# Patient Record
Sex: FEMALE | Race: OTHER | HISPANIC OR LATINO | Employment: OTHER | URBAN - METROPOLITAN AREA
[De-identification: names, ages, dates, MRNs, and addresses within clinical notes are randomized per-mention and may not be internally consistent; named-entity substitution may affect disease eponyms.]

---

## 2022-08-22 ENCOUNTER — APPOINTMENT (OUTPATIENT)
Dept: RADIOLOGY | Facility: MEDICAL CENTER | Age: 57
End: 2022-08-22
Attending: STUDENT IN AN ORGANIZED HEALTH CARE EDUCATION/TRAINING PROGRAM
Payer: COMMERCIAL

## 2022-08-22 ENCOUNTER — HOSPITAL ENCOUNTER (EMERGENCY)
Facility: MEDICAL CENTER | Age: 57
End: 2022-08-22
Attending: STUDENT IN AN ORGANIZED HEALTH CARE EDUCATION/TRAINING PROGRAM
Payer: COMMERCIAL

## 2022-08-22 VITALS
HEART RATE: 73 BPM | TEMPERATURE: 97.4 F | OXYGEN SATURATION: 94 % | WEIGHT: 184.75 LBS | DIASTOLIC BLOOD PRESSURE: 64 MMHG | SYSTOLIC BLOOD PRESSURE: 121 MMHG | BODY MASS INDEX: 31.54 KG/M2 | RESPIRATION RATE: 16 BRPM | HEIGHT: 64 IN

## 2022-08-22 DIAGNOSIS — S61.112A LACERATION OF LEFT THUMB WITHOUT FOREIGN BODY WITH DAMAGE TO NAIL, INITIAL ENCOUNTER: ICD-10-CM

## 2022-08-22 DIAGNOSIS — S61.209A AVULSION OF FINGER TIP, INITIAL ENCOUNTER: ICD-10-CM

## 2022-08-22 PROCEDURE — 99283 EMERGENCY DEPT VISIT LOW MDM: CPT

## 2022-08-22 PROCEDURE — 700111 HCHG RX REV CODE 636 W/ 250 OVERRIDE (IP): Performed by: STUDENT IN AN ORGANIZED HEALTH CARE EDUCATION/TRAINING PROGRAM

## 2022-08-22 PROCEDURE — 73140 X-RAY EXAM OF FINGER(S): CPT | Mod: LT

## 2022-08-22 PROCEDURE — 64450 NJX AA&/STRD OTHER PN/BRANCH: CPT

## 2022-08-22 RX ORDER — IBUPROFEN 600 MG/1
600 TABLET ORAL EVERY 6 HOURS PRN
Qty: 30 TABLET | Refills: 0 | Status: SHIPPED | OUTPATIENT
Start: 2022-08-22

## 2022-08-22 RX ORDER — ACETAMINOPHEN 500 MG
500-1000 TABLET ORAL EVERY 6 HOURS PRN
Qty: 30 TABLET | Refills: 0 | Status: SHIPPED | OUTPATIENT
Start: 2022-08-22

## 2022-08-22 RX ORDER — BUPIVACAINE HYDROCHLORIDE 2.5 MG/ML
10 INJECTION, SOLUTION EPIDURAL; INFILTRATION; INTRACAUDAL ONCE
Status: COMPLETED | OUTPATIENT
Start: 2022-08-22 | End: 2022-08-22

## 2022-08-22 RX ADMIN — BUPIVACAINE HYDROCHLORIDE 10 ML: 2.5 INJECTION, SOLUTION EPIDURAL; INFILTRATION; INTRACAUDAL; PERINEURAL at 14:45

## 2022-08-22 NOTE — ED TRIAGE NOTES
"Chief Complaint   Patient presents with    T-5000 Lacerations     Pt has laceration to L thumb nail after cutting chicken when her granddaughter bumped into her causing part of her L thumb nail to come off       Pt ambulatory into triage for above. Pt from [facility] for possible avulsion, unknown if cut is to the bone.     Pts thumb wrapped at [facility]. movement intact to L thumb      /74   Pulse 76   Temp 36.3 °C (97.3 °F) (Temporal)   Resp 14   Ht 1.626 m (5' 4\")   Wt 83.8 kg (184 lb 11.9 oz)   SpO2 99%   BMI 31.71 kg/m²     "

## 2022-08-22 NOTE — ED PROVIDER NOTES
" CHIEF COMPLAINT  Chief Complaint   Patient presents with    T-5000 Lacerations     Pt has laceration to L thumb nail after cutting chicken when her granddaughter bumped into her causing part of her L thumb nail to come off       HPI  Debby Baca is a 57 y.o. female who presents evaluation of an avulsion injury to her left thumb.  She was cutting chicken when her granddaughter bumped her causing her to cut the tip of her left thumb.  She went to an urgent care and was instructed to come to the ER.  Currently is complaining of a moderate throbbing pain in the left thumb, does have flexion extension of the MCP and DIP joint.  Tetanus is up-to-date.  No other complaints.  Tetanus is up-to-date    REVIEW OF SYSTEMS  See HPI for further details. All other systems are negative.     PAST MEDICAL HISTORY       SOCIAL HISTORY  Social History     Tobacco Use    Smoking status: Never    Smokeless tobacco: Never   Substance and Sexual Activity    Alcohol use: Never    Drug use: Never    Sexual activity: Not on file       SURGICAL HISTORY  patient denies any surgical history    CURRENT MEDICATIONS  Home Medications       Reviewed by Alina Lea R.N. (Registered Nurse) on 08/22/22 at 1358  Med List Status: Not Addressed     Medication Last Dose Status        Patient Hussain Taking any Medications                           ALLERGIES  Allergies   Allergen Reactions    Penicillins Anaphylaxis and Rash     All over rash and hives       PHYSICAL EXAM  VITAL SIGNS: /74   Pulse 76   Temp 36.3 °C (97.3 °F) (Temporal)   Resp 14   Ht 1.626 m (5' 4\")   Wt 83.8 kg (184 lb 11.9 oz)   SpO2 99%   BMI 31.71 kg/m²  @LEONARD[[id number]::@  Pulse ox interpretation:I interpret this pulse ox as normal.  VITALS - vital signs documented prior to this note have been reviewed and noted,  see EHR  GENERAL - awake and alert, no acute distress  HEENT - normocephalic, atraumatic, moist mucus membranes  CARDIOVASCULAR - regular rate and " "rhythm  PULMONARY - unlabored, no respiratory distress. No audible wheezing or  stridor.  Hand: Left hand she has an avulsion injury to the distal aspect of her left thumb that includes the distal nail, normal flexion extension at the DIP and MCP joints independently, cap refill is intact, and CMS is intact distally.  No suturable nailbed lacerations.  GASTROINTESTINAL - non-tender, non-distended  NEUROLOGIC - mental status normal, speech fluid, cognition normal  MUSCULOSKELETAL -no obvious deformity or swelling  DERMATOLOGIC - warm and dry, no visible rashes  PSYCHIATRIC - normal affect, normal concentration          COURSE/PROCEDURES    Block    Labs Reviewed - No data to display    DX-FINGER(S) 2+ LEFT    (Results Pending)       Risks and benefits: risks, benefits and alternatives were discussed  Consent given by: patient and/or guardian  Patient understanding: patient/guardian states understanding of the procedure being performed  Patient consent: the patient/guardian's understanding of the procedure matches consent given  Patient identity confirmed: verbally with patient and arm band  Time out: Immediately prior to procedure a \"time out\" was called to verify the correct patient, procedure, equipment, support staff and site/side marked as required.  Location details: Left thumb digital block  Injection: using standard, sterile technique 2.5 cc of bupivacaine were injected bilaterally along the base at the radial and ulnar aspect of the first phalanx complication: Tolerated well without complication.       MEDICAL DECISION MAKING    Patient presented for evaluation of thumb laceration.  On examination does have an avulsion injury of the distal aspect of her left thumb including part of the nail.  Wound is nonsuturable.  Her tetanus is up-to-date.  X-rays obtained does not appear to be any bony involvement or foreign body.  Wound will have to heal via secondary intention, she is placed in a nonadherent dressing, " given wound care instructions.  Placed in a splint and given orthopedic follow-up.  All pertinent return precautions were discussed with the patient, and they expressed understanding.  Patient was discharged in a stable condition            FINAL IMPRESSION  1.  Distal thumb avulsion         Dictation Disclaimer  Please note this report has been produced using speech recognition software and  may contain errors related to that system, including errors seen in grammar,  punctuation and spelling, as well as words and phrases that may be inappropriate.  If there are any questions or concerns, please feel free to contact the dictating  physician for clarification.        Electronically signed by: Gary Abdi D.O., 8/22/2022 2:19 PM

## 2022-10-05 NOTE — DISCHARGE INSTRUCTIONS
Keep the wound clean and dry, you may clean with gentle soap and water otherwise keep the wound covered in the splint, if you develop any redness swelling pus draining from wound return for recheck.  Return with other concerns.  
Implemented All Fall Risk Interventions:  [place] to call system. Call bell, personal items and telephone within reach. Instruct patient to call for assistance. Room bathroom lighting operational. Non-slip footwear when patient is off stretcher. Physically safe environment: no spills, clutter or unnecessary equipment. Stretcher in lowest position, wheels locked, appropriate side rails in place. Provide visual cue, wrist band, yellow gown, etc. Monitor gait and stability. Monitor for mental status changes and reorient to person, place, and time. Review medications for side effects contributing to fall risk. Reinforce activity limits and safety measures with patient and family.
None known in lifetime